# Patient Record
Sex: MALE | Race: BLACK OR AFRICAN AMERICAN
[De-identification: names, ages, dates, MRNs, and addresses within clinical notes are randomized per-mention and may not be internally consistent; named-entity substitution may affect disease eponyms.]

---

## 2020-09-02 ENCOUNTER — HOSPITAL ENCOUNTER (EMERGENCY)
Dept: HOSPITAL 62 - ER | Age: 50
LOS: 1 days | Discharge: HOME | End: 2020-09-03
Payer: COMMERCIAL

## 2020-09-02 DIAGNOSIS — R00.2: Primary | ICD-10-CM

## 2020-09-02 DIAGNOSIS — F17.200: ICD-10-CM

## 2020-09-02 DIAGNOSIS — I10: ICD-10-CM

## 2020-09-02 DIAGNOSIS — J44.9: ICD-10-CM

## 2020-09-02 DIAGNOSIS — R55: ICD-10-CM

## 2020-09-02 LAB
ADD MANUAL DIFF: NO
ALBUMIN SERPL-MCNC: 4.9 G/DL (ref 3.5–5)
ALP SERPL-CCNC: 67 U/L (ref 38–126)
ANION GAP SERPL CALC-SCNC: 10 MMOL/L (ref 5–19)
AST SERPL-CCNC: 25 U/L (ref 17–59)
BASOPHILS # BLD AUTO: 0 10^3/UL (ref 0–0.2)
BASOPHILS NFR BLD AUTO: 0.2 % (ref 0–2)
BILIRUB DIRECT SERPL-MCNC: 0.2 MG/DL (ref 0–0.4)
BILIRUB SERPL-MCNC: 0.5 MG/DL (ref 0.2–1.3)
BUN SERPL-MCNC: 13 MG/DL (ref 7–20)
CALCIUM: 9.3 MG/DL (ref 8.4–10.2)
CHLORIDE SERPL-SCNC: 105 MMOL/L (ref 98–107)
CK MB SERPL-MCNC: 1.65 NG/ML (ref ?–4.55)
CK SERPL-CCNC: 297 U/L (ref 55–170)
CO2 SERPL-SCNC: 27 MMOL/L (ref 22–30)
EOSINOPHIL # BLD AUTO: 0.1 10^3/UL (ref 0–0.6)
EOSINOPHIL NFR BLD AUTO: 1.6 % (ref 0–6)
ERYTHROCYTE [DISTWIDTH] IN BLOOD BY AUTOMATED COUNT: 14.9 % (ref 11.5–14)
GLUCOSE SERPL-MCNC: 99 MG/DL (ref 75–110)
HCT VFR BLD CALC: 45.6 % (ref 37.9–51)
HGB BLD-MCNC: 15.1 G/DL (ref 13.5–17)
LYMPHOCYTES # BLD AUTO: 1.3 10^3/UL (ref 0.5–4.7)
LYMPHOCYTES NFR BLD AUTO: 22.8 % (ref 13–45)
MCH RBC QN AUTO: 29.5 PG (ref 27–33.4)
MCHC RBC AUTO-ENTMCNC: 33 G/DL (ref 32–36)
MCV RBC AUTO: 89 FL (ref 80–97)
MONOCYTES # BLD AUTO: 0.4 10^3/UL (ref 0.1–1.4)
MONOCYTES NFR BLD AUTO: 6.7 % (ref 3–13)
NEUTROPHILS # BLD AUTO: 4 10^3/UL (ref 1.7–8.2)
NEUTS SEG NFR BLD AUTO: 68.7 % (ref 42–78)
PLATELET # BLD: 254 10^3/UL (ref 150–450)
POTASSIUM SERPL-SCNC: 4.2 MMOL/L (ref 3.6–5)
PROT SERPL-MCNC: 8.4 G/DL (ref 6.3–8.2)
RBC # BLD AUTO: 5.1 10^6/UL (ref 4.35–5.55)
TOTAL CELLS COUNTED % (AUTO): 100 %
TROPONIN I SERPL-MCNC: < 0.012 NG/ML
WBC # BLD AUTO: 5.9 10^3/UL (ref 4–10.5)

## 2020-09-02 PROCEDURE — 85025 COMPLETE CBC W/AUTO DIFF WBC: CPT

## 2020-09-02 PROCEDURE — 93005 ELECTROCARDIOGRAM TRACING: CPT

## 2020-09-02 PROCEDURE — 82550 ASSAY OF CK (CPK): CPT

## 2020-09-02 PROCEDURE — 36415 COLL VENOUS BLD VENIPUNCTURE: CPT

## 2020-09-02 PROCEDURE — 93010 ELECTROCARDIOGRAM REPORT: CPT

## 2020-09-02 PROCEDURE — 71046 X-RAY EXAM CHEST 2 VIEWS: CPT

## 2020-09-02 PROCEDURE — 99285 EMERGENCY DEPT VISIT HI MDM: CPT

## 2020-09-02 PROCEDURE — 82553 CREATINE MB FRACTION: CPT

## 2020-09-02 PROCEDURE — 80053 COMPREHEN METABOLIC PANEL: CPT

## 2020-09-02 PROCEDURE — 84484 ASSAY OF TROPONIN QUANT: CPT

## 2020-09-02 NOTE — ER DOCUMENT REPORT
ED Medical Screen (RME)





- General


Chief Complaint: Chest Pain


Stated Complaint: CHEST PAIN/WEAKNESS


Time Seen by Provider: 09/02/20 22:13


Primary Care Provider: 


KAYLEY FRIAS MD [Primary Care Provider] - Follow up as needed


Mode of Arrival: Ambulatory


Information source: Patient


Notes: 





HPI; 49-year-old male presents to the emergency room complaining of intermittent

palpitations with dizziness that started around 8 PM tonight.  Denies any chest 

pain or shortness of breath.  No cardiac history outside of a heart murmur.  No 

medications for symptoms.  No chest trauma.





PE:


Alert and oriented x3.  Mild distress noted.  Lungs: Clear to auscultation 

without rales, rhonchi, wheezes.  Heart: Regular rate and rhythm without 

murmurs, rubs, gallops.





I have greeted and performed a rapid initial assessment of this patient.  A 

comprehensive ED assessment and evaluation of the patient, analysis of test 

results and completion of the medical decision making process will be conducted 

by additional ED providers.  I have specifically instructed the patient or 

family members with the patient to immediately return to any nursing staff 

should anything change in the patient's condition or with their chief complaint.


TRAVEL OUTSIDE OF THE [place] IN LAST 30 DAYS: No





- Related Data


Allergies/Adverse Reactions: 


                                        





No Known Allergies Allergy (Verified 09/02/20 22:12)


   











Past Medical History





- Past Medical History


Cardiac Medical History: Reports: Hx Hypertension


Pulmonary Medical History: Reports: Hx COPD


Endocrine Medical History: Denies: Hx Diabetes Mellitus Type 2





- Immunizations


Hx Diphtheria, Pertussis, Tetanus Vaccination: Yes





Physical Exam





- Vital signs


Vitals: 


                                        











Temp Pulse Resp BP Pulse Ox


 


 98.5 F   94   18   156/89 H  95 


 


 09/02/20 21:42  09/02/20 21:42  09/02/20 21:42  09/02/20 21:42  09/02/20 21:42














Course





- Vital Signs


Vital signs: 


                                        











Temp Pulse Resp BP Pulse Ox


 


 98.5 F   94   18   156/89 H  95 


 


 09/02/20 21:42  09/02/20 21:42  09/02/20 21:42  09/02/20 21:42  09/02/20 21:42














Doctor's Discharge





- Discharge


Referrals: 


KAYLEY FRIAS MD [Primary Care Provider] - Follow up as needed

## 2020-09-03 VITALS — SYSTOLIC BLOOD PRESSURE: 157 MMHG | DIASTOLIC BLOOD PRESSURE: 89 MMHG

## 2020-09-03 NOTE — ER DOCUMENT REPORT
ED General





- General


Chief Complaint: Palpitations


Stated Complaint: CHEST PAIN/WEAKNESS


Time Seen by Provider: 09/02/20 22:13


Primary Care Provider: 


KAYLEY FRIAS MD [Primary Care Provider] - Follow up as needed


Mode of Arrival: Ambulatory


TRAVEL OUTSIDE OF THE [place] IN LAST 30 DAYS: No





- HPI


Notes: 





Patient presents to the emergency department for evaluation of palpitations.  He

was lying back in his recliner.  He had a sudden onset of palpitations.  He then

felt a "rush of heat".  He stood up, felt dizzy and near syncopal.  He did not 

have a syncopal episode.  He had no nausea, no diaphoresis.  He denies any 

shortness of breath or chest pain.  He states that he has not had any recurrence

of the symptoms since then.  He denies excessive caffeine use.  He has not seen 

a doctor in some time.





- Related Data


Allergies/Adverse Reactions: 


                                        





No Known Allergies Allergy (Verified 09/02/20 22:12)


   








Home Medications: None





Past Medical History





- General


Information source: Patient





- Social History


Smoking Status: Current Every Day Smoker


Frequency of alcohol use: None


Drug Abuse: None


Family History: Reviewed & Not Pertinent, DM





- Past Medical History


Cardiac Medical History: Reports: Hx Hypertension - Once required meds, improved

with lifestyle changes


Pulmonary Medical History: Reports: Hx COPD


Endocrine Medical History: Denies: Hx Diabetes Mellitus Type 2





- Immunizations


Hx Diphtheria, Pertussis, Tetanus Vaccination: Yes





Review of Systems





- Review of Systems


Constitutional: No symptoms reported


EENT: No symptoms reported


Cardiovascular: See HPI


Respiratory: No symptoms reported


Gastrointestinal: No symptoms reported


Genitourinary: No symptoms reported


Musculoskeletal: No symptoms reported


Skin: No symptoms reported


Neurological/Psychological: No symptoms reported





Physical Exam





- Vital signs


Vitals: 





                                        











Temp Pulse Resp BP Pulse Ox


 


 98.5 F   94   18   156/89 H  95 


 


 09/02/20 21:42  09/02/20 21:42  09/02/20 21:42  09/02/20 21:42  09/02/20 21:42














- Notes


Notes: 





Vital signs reviewed, please refer to chart. Head is normocephalic, atraumatic. 

Pupils equal round, reactive to light.  Neck is supple without meningismus.  

Heart is regular rate and rhythm.  Lungs are clear to auscultation bilaterally. 

Abdomen is soft, nontender, normoactive bowel sounds throughout.  Extremities 

without cyanosis, clubbing. Posterior calves are nontender.  Peripheral pulses 

are equal.  Skin is warm and dry.  Patient is awake, alert, neurological exam is

nonfocal.





Course





- Re-evaluation


Re-evalutation: 





09/03/20 02:33


Vital signs reviewed, please refer to chart. Head is normocephalic, atraumatic. 

Pupils equal round, reactive to light.  Neck is supple without meningismus.  

Heart is regular rate and rhythm.  Lungs are clear to auscultation bilaterally. 

Abdomen is soft, nontender, normoactive bowel sounds throughout.  Extremities 

without cyanosis, clubbing. Posterior calves are nontender.  Peripheral pulses 

are equal.  Skin is warm and dry.  Patient is awake, alert, neurological exam is

nonfocal.





- Vital Signs


Vital signs: 





                                        











Temp Pulse Resp BP Pulse Ox


 


 98.5 F   94   18   156/89 H  100 


 


 09/02/20 21:42  09/02/20 21:42  09/02/20 21:42  09/02/20 21:42  09/02/20 22:16














- Laboratory


Result Diagrams: 


                                 09/02/20 22:23





                                 09/02/20 22:23


Laboratory results interpreted by me: 





                                        











  09/02/20 09/02/20





  22:23 22:23


 


RDW  14.9 H 


 


Creatine Kinase   297 H


 


Total Protein   8.4 H














- Diagnostic Test


Radiology reviewed: Reports reviewed


Radiology results interpreted by me: 





09/03/20 02:34





                                        





Chest X-Ray  09/02/20 22:16


IMPRESSION:


 


Negative chest


 


 


copyright 2011 [employer]- All Rights Reserved


 














- EKG Interpretation by Me


Additional EKG results interpreted by me: 





09/03/20 02:34


Sinus mechanism with a rate of 89 bpm.  Normal axis and intervals.  Findings 

concerning for LVH.  No acute ST or T wave changes concerning for ischemia or 

infarction.





Discharge





- Discharge


Clinical Impression: 


 Palpitations, Near syncope, Elevated blood pressure reading





Condition: Stable


Disposition: HOME, SELF-CARE


Instructions:  Palpitations (Irregular or Rapid Heartrate) ([facility]), High Blood 

Pressure ([facility])


Additional Instructions: 


Stay well-hydrated.  Avoid high sodium diet.  Avoid caffeine.  Try to quit 

smoking.  Follow-up with primary care, bring your blood pressure readings for fu

rther evaluation.  If you develop worsening or new concerning symptoms of any 

sort, please return immediately to the emergency department for reevaluation.


Forms:  Smoking Cessation Education


Referrals: 


KAYELY FRIAS MD [Primary Care Provider] - Follow up as needed

## 2020-09-03 NOTE — EKG REPORT
SEVERITY:- BORDERLINE ECG -

SINUS RHYTHM

PROBABLE LEFT ATRIAL ABNORMALITY

:

Confirmed by: Arcadio Wolfe MD 03-Sep-2020 07:45:56